# Patient Record
Sex: FEMALE | Race: WHITE | NOT HISPANIC OR LATINO | URBAN - METROPOLITAN AREA
[De-identification: names, ages, dates, MRNs, and addresses within clinical notes are randomized per-mention and may not be internally consistent; named-entity substitution may affect disease eponyms.]

---

## 2020-08-25 ENCOUNTER — OFFICE VISIT (OUTPATIENT)
Dept: URBAN - METROPOLITAN AREA TELEHEALTH 2 | Facility: TELEHEALTH | Age: 60
End: 2020-08-25

## 2020-08-25 ENCOUNTER — OFFICE VISIT (OUTPATIENT)
Dept: URBAN - METROPOLITAN AREA CLINIC 63 | Facility: CLINIC | Age: 60
End: 2020-08-25

## 2020-10-05 ENCOUNTER — OFFICE VISIT (OUTPATIENT)
Dept: URBAN - METROPOLITAN AREA CLINIC 121 | Facility: CLINIC | Age: 60
End: 2020-10-05

## 2021-06-02 ENCOUNTER — OFFICE VISIT (OUTPATIENT)
Dept: URBAN - METROPOLITAN AREA CLINIC 60 | Facility: CLINIC | Age: 61
End: 2021-06-02

## 2021-07-02 LAB
% SATURATION: (no result)
FERRITIN: (no result)
FOLATE, SERUM: (no result)
IRON BINDING CAPACITY: (no result)
IRON, TOTAL: (no result)
MITOGEN-NIL: (no result)
NIL: (no result)
QUANTIFERON(R)-TB GOLD PLUS, 1 TUBE: NEGATIVE
TB1-NIL: (no result)
TB2-NIL: (no result)
VITAMIN B12: (no result)

## 2021-10-11 ENCOUNTER — OFFICE VISIT (OUTPATIENT)
Dept: URBAN - METROPOLITAN AREA CLINIC 121 | Facility: CLINIC | Age: 61
End: 2021-10-11

## 2022-02-23 ENCOUNTER — OFFICE VISIT (OUTPATIENT)
Dept: URBAN - METROPOLITAN AREA CLINIC 60 | Facility: CLINIC | Age: 62
End: 2022-02-23

## 2022-05-12 LAB
HEPATITIS B CORE ANTIBODY (IGM): (no result)
HEPATITIS B SURFACE ANTIGEN: (no result)

## 2022-06-10 ENCOUNTER — LAB OUTSIDE AN ENCOUNTER (OUTPATIENT)
Dept: URBAN - METROPOLITAN AREA CLINIC 121 | Facility: CLINIC | Age: 62
End: 2022-06-10

## 2022-06-16 LAB
RESULT:: (no result)
TEST NAME:: (no result)

## 2022-07-09 ENCOUNTER — TELEPHONE ENCOUNTER (OUTPATIENT)
Dept: URBAN - METROPOLITAN AREA CLINIC 121 | Facility: CLINIC | Age: 62
End: 2022-07-09

## 2022-07-09 RX ORDER — UMECLIDINIUM BROMIDE AND VILANTEROL TRIFENATATE 62.5; 25 UG/1; UG/1
POWDER RESPIRATORY (INHALATION) ONCE A DAY
Refills: 0 | OUTPATIENT
Start: 2018-08-10 | End: 2018-09-21

## 2022-07-09 RX ORDER — FERROUS SULFATE 325(65) MG
TABLET ORAL ONCE A DAY
Refills: 0 | OUTPATIENT
Start: 2018-08-10 | End: 2018-09-21

## 2022-07-09 RX ORDER — PREDNISONE 20 MG/1
2 ONCE A DAY TAKE 2 TABLETS ONCE A DAY IN THE MORNING TABLET ORAL
Refills: 0 | OUTPATIENT
Start: 2018-07-02 | End: 2018-09-21

## 2022-07-09 RX ORDER — GABAPENTIN 300 MG/1
CAPSULE ORAL TWICE A DAY
Refills: 0 | OUTPATIENT
Start: 2018-09-21 | End: 2018-11-30

## 2022-07-09 RX ORDER — ASPIRIN 81 MG/1
TABLET, DELAYED RELEASE ORAL ONCE A DAY
Refills: 0 | OUTPATIENT
Start: 2018-11-30 | End: 2021-06-02

## 2022-07-09 RX ORDER — ATENOLOL 100 MG/1
TABLET ORAL ONCE A DAY
Refills: 0 | OUTPATIENT
Start: 2018-09-21 | End: 2018-11-30

## 2022-07-09 RX ORDER — POTASSIUM CITRATE 10 MEQ/1
TABLET, EXTENDED RELEASE ORAL
Refills: 0 | OUTPATIENT
Start: 2018-08-10 | End: 2018-09-21

## 2022-07-09 RX ORDER — MELOXICAM 15 MG/1
TABLET ORAL ONCE A DAY
Refills: 0 | OUTPATIENT
Start: 2018-05-15 | End: 2018-06-19

## 2022-07-09 RX ORDER — MELOXICAM 15 MG/1
TABLET ORAL ONCE A DAY
Refills: 0 | OUTPATIENT
Start: 2021-06-02 | End: 2022-02-23

## 2022-07-09 RX ORDER — OMEPRAZOLE 40 MG/1
ONCE A DAY CAPSULE, DELAYED RELEASE ORAL ONCE A DAY
Refills: 3 | OUTPATIENT
Start: 2021-06-02 | End: 2022-06-20

## 2022-07-09 RX ORDER — MELOXICAM 15 MG/1
TABLET ORAL ONCE A DAY
Refills: 0 | OUTPATIENT
Start: 2018-06-19 | End: 2018-08-10

## 2022-07-09 RX ORDER — FLUTICASONE PROPIONATE 50 UG/1
SPRAY, METERED NASAL TWICE A DAY
Refills: 0 | OUTPATIENT
Start: 2018-11-30 | End: 2021-06-02

## 2022-07-09 RX ORDER — AZATHIOPRINE 50 MG/1
ONCE A DAY TABLET ORAL ONCE A DAY
Refills: 0 | OUTPATIENT
Start: 2018-08-10 | End: 2018-09-21

## 2022-07-09 RX ORDER — ALLOPURINOL 100 MG/1
ONCE A DAY TABLET ORAL ONCE A DAY
Refills: 1 | OUTPATIENT
Start: 2018-09-27 | End: 2019-04-19

## 2022-07-09 RX ORDER — HYDROCODONE BITARTRATE AND ACETAMINOPHEN 7.5; 325 MG/1; MG/1
TABLET ORAL
Refills: 0 | OUTPATIENT
Start: 2018-06-19 | End: 2018-08-10

## 2022-07-09 RX ORDER — SIMVASTATIN 40 MG/1
TABLET, FILM COATED ORAL ONCE A DAY
Refills: 0 | OUTPATIENT
Start: 2018-11-30 | End: 2021-06-02

## 2022-07-09 RX ORDER — GABAPENTIN 300 MG/1
CAPSULE ORAL TWICE A DAY
Refills: 0 | OUTPATIENT
Start: 2018-08-10 | End: 2018-09-21

## 2022-07-09 RX ORDER — ALBUTEROL SULFATE 90 UG/1
AEROSOL, METERED RESPIRATORY (INHALATION) TWICE A DAY
Refills: 0 | OUTPATIENT
Start: 2018-11-30 | End: 2021-06-02

## 2022-07-09 RX ORDER — ASPIRIN 81 MG/1
TABLET, DELAYED RELEASE ORAL ONCE A DAY
Refills: 0 | OUTPATIENT
Start: 2018-08-10 | End: 2018-09-21

## 2022-07-09 RX ORDER — FLUTICASONE PROPIONATE 50 UG/1
SPRAY, METERED NASAL TWICE A DAY
Refills: 0 | OUTPATIENT
Start: 2018-09-21 | End: 2018-11-30

## 2022-07-09 RX ORDER — ALBUTEROL SULFATE 90 UG/1
AEROSOL, METERED RESPIRATORY (INHALATION) TWICE A DAY
Refills: 0 | OUTPATIENT
Start: 2018-05-15 | End: 2018-06-19

## 2022-07-09 RX ORDER — ASPIRIN 81 MG/1
TABLET, DELAYED RELEASE ORAL ONCE A DAY
Refills: 0 | OUTPATIENT
Start: 2018-09-21 | End: 2018-11-30

## 2022-07-09 RX ORDER — SIMVASTATIN 40 MG/1
TABLET, FILM COATED ORAL ONCE A DAY
Refills: 0 | OUTPATIENT
Start: 2018-06-19 | End: 2018-08-10

## 2022-07-09 RX ORDER — ALLOPURINOL 100 MG/1
ONCE A DAY TABLET ORAL ONCE A DAY
Refills: 3 | OUTPATIENT
Start: 2019-08-14 | End: 2020-02-26

## 2022-07-09 RX ORDER — POTASSIUM CITRATE 10 MEQ/1
TABLET, EXTENDED RELEASE ORAL
Refills: 0 | OUTPATIENT
Start: 2018-11-30 | End: 2021-06-02

## 2022-07-09 RX ORDER — GABAPENTIN 300 MG/1
CAPSULE ORAL TWICE A DAY
Refills: 0 | OUTPATIENT
Start: 2018-06-19 | End: 2018-08-10

## 2022-07-09 RX ORDER — POTASSIUM CITRATE 10 MEQ/1
TABLET, EXTENDED RELEASE ORAL
Refills: 0 | OUTPATIENT
Start: 2018-05-15 | End: 2018-06-19

## 2022-07-09 RX ORDER — SIMVASTATIN 40 MG/1
TABLET, FILM COATED ORAL ONCE A DAY
Refills: 0 | OUTPATIENT
Start: 2018-09-21 | End: 2018-11-30

## 2022-07-09 RX ORDER — OMEPRAZOLE 40 MG/1
ONCE A DAY CAPSULE, DELAYED RELEASE ORAL ONCE A DAY
Refills: 3 | OUTPATIENT
Start: 2020-02-26 | End: 2020-08-25

## 2022-07-09 RX ORDER — OMEPRAZOLE 40 MG/1
ONCE A DAY CAPSULE, DELAYED RELEASE ORAL ONCE A DAY
Refills: 3 | OUTPATIENT
Start: 2020-08-25 | End: 2021-06-02

## 2022-07-09 RX ORDER — ALBUTEROL SULFATE 90 UG/1
AEROSOL, METERED RESPIRATORY (INHALATION) TWICE A DAY
Refills: 0 | OUTPATIENT
Start: 2018-06-19 | End: 2018-08-10

## 2022-07-09 RX ORDER — ASPIRIN 81 MG/1
TABLET, DELAYED RELEASE ORAL ONCE A DAY
Refills: 0 | OUTPATIENT
Start: 2018-06-19 | End: 2018-08-10

## 2022-07-09 RX ORDER — OMEPRAZOLE 40 MG/1
ONCE A DAY CAPSULE, DELAYED RELEASE ORAL ONCE A DAY
Refills: 3 | OUTPATIENT
Start: 2019-08-14 | End: 2020-02-26

## 2022-07-09 RX ORDER — ALBUTEROL SULFATE 0.63 MG/3ML
SOLUTION RESPIRATORY (INHALATION) AS NEEDED
Refills: 0 | OUTPATIENT
Start: 2018-06-19 | End: 2018-08-10

## 2022-07-09 RX ORDER — ATENOLOL 100 MG/1
TABLET ORAL ONCE A DAY
Refills: 0 | OUTPATIENT
Start: 2018-05-15 | End: 2018-06-19

## 2022-07-09 RX ORDER — FLUTICASONE PROPIONATE 50 UG/1
SPRAY, METERED NASAL TWICE A DAY
Refills: 0 | OUTPATIENT
Start: 2018-05-15 | End: 2018-06-19

## 2022-07-09 RX ORDER — ALBUTEROL SULFATE 0.63 MG/3ML
SOLUTION RESPIRATORY (INHALATION) AS NEEDED
Refills: 0 | OUTPATIENT
Start: 2018-05-15 | End: 2018-06-19

## 2022-07-09 RX ORDER — SIMVASTATIN 40 MG/1
TABLET, FILM COATED ORAL ONCE A DAY
Refills: 0 | OUTPATIENT
Start: 2018-05-15 | End: 2018-06-19

## 2022-07-09 RX ORDER — FERROUS SULFATE 325(65) MG
TABLET ORAL ONCE A DAY
Refills: 0 | OUTPATIENT
Start: 2018-09-21 | End: 2018-11-30

## 2022-07-09 RX ORDER — SIMVASTATIN 40 MG/1
TABLET, FILM COATED ORAL ONCE A DAY
Refills: 0 | OUTPATIENT
Start: 2018-08-10 | End: 2018-09-21

## 2022-07-09 RX ORDER — POTASSIUM CITRATE 10 MEQ/1
TABLET, EXTENDED RELEASE ORAL
Refills: 0 | OUTPATIENT
Start: 2018-06-19 | End: 2018-08-10

## 2022-07-09 RX ORDER — ALBUTEROL SULFATE 90 UG/1
AEROSOL, METERED RESPIRATORY (INHALATION) TWICE A DAY
Refills: 0 | OUTPATIENT
Start: 2018-09-21 | End: 2018-11-30

## 2022-07-09 RX ORDER — AZATHIOPRINE 50 MG/1
ONCE A DAY TABLET ORAL ONCE A DAY
Refills: 1 | OUTPATIENT
Start: 2019-04-19 | End: 2019-08-14

## 2022-07-09 RX ORDER — ALLOPURINOL 100 MG/1
ONCE A DAY TABLET ORAL ONCE A DAY
Refills: 2 | OUTPATIENT
Start: 2018-07-13 | End: 2018-09-21

## 2022-07-09 RX ORDER — ATENOLOL 100 MG/1
TABLET ORAL ONCE A DAY
Refills: 0 | OUTPATIENT
Start: 2018-06-19 | End: 2018-08-10

## 2022-07-09 RX ORDER — FERROUS SULFATE 325(65) MG
TABLET ORAL ONCE A DAY
Refills: 0 | OUTPATIENT
Start: 2018-06-19 | End: 2018-08-10

## 2022-07-09 RX ORDER — ATENOLOL 100 MG/1
TABLET ORAL ONCE A DAY
Refills: 0 | OUTPATIENT
Start: 2018-11-30 | End: 2021-06-02

## 2022-07-09 RX ORDER — ALBUTEROL SULFATE 0.63 MG/3ML
SOLUTION RESPIRATORY (INHALATION) AS NEEDED
Refills: 0 | OUTPATIENT
Start: 2018-08-10 | End: 2018-09-21

## 2022-07-09 RX ORDER — HYDROCODONE BITARTRATE AND ACETAMINOPHEN 7.5; 325 MG/1; MG/1
TABLET ORAL
Refills: 0 | OUTPATIENT
Start: 2018-05-15 | End: 2018-06-19

## 2022-07-09 RX ORDER — AZATHIOPRINE 50 MG/1
ONCE A DAY TABLET ORAL ONCE A DAY
Refills: 3 | OUTPATIENT
Start: 2020-02-26 | End: 2020-08-25

## 2022-07-09 RX ORDER — AZATHIOPRINE 50 MG/1
ONCE A DAY TABLET ORAL ONCE A DAY
Refills: 1 | OUTPATIENT
Start: 2018-09-27 | End: 2019-04-19

## 2022-07-09 RX ORDER — ALBUTEROL SULFATE 0.63 MG/3ML
SOLUTION RESPIRATORY (INHALATION) AS NEEDED
Refills: 0 | OUTPATIENT
Start: 2018-11-30 | End: 2021-06-02

## 2022-07-09 RX ORDER — FERROUS SULFATE 325(65) MG
TABLET ORAL ONCE A DAY
Refills: 0 | OUTPATIENT
Start: 2018-11-30 | End: 2021-06-02

## 2022-07-09 RX ORDER — GABAPENTIN 300 MG/1
CAPSULE ORAL TWICE A DAY
Refills: 0 | OUTPATIENT
Start: 2018-05-15 | End: 2018-06-19

## 2022-07-09 RX ORDER — FERROUS SULFATE 325(65) MG
TABLET ORAL ONCE A DAY
Refills: 0 | OUTPATIENT
Start: 2018-05-15 | End: 2018-06-19

## 2022-07-09 RX ORDER — HYDROCODONE BITARTRATE AND ACETAMINOPHEN 7.5; 325 MG/1; MG/1
TABLET ORAL
Refills: 0 | OUTPATIENT
Start: 2018-08-10 | End: 2018-09-21

## 2022-07-09 RX ORDER — HYDROCODONE BITARTRATE AND ACETAMINOPHEN 7.5; 325 MG/1; MG/1
TABLET ORAL
Refills: 0 | OUTPATIENT
Start: 2018-09-21 | End: 2018-11-30

## 2022-07-09 RX ORDER — ASPIRIN 81 MG/1
TABLET, DELAYED RELEASE ORAL ONCE A DAY
Refills: 0 | OUTPATIENT
Start: 2018-05-15 | End: 2018-06-19

## 2022-07-09 RX ORDER — PREDNISONE 20 MG/1
TAKE 2 TABLETS ONCE A DAY IN THE MORNING TABLET ORAL
Refills: 0 | OUTPATIENT
Start: 2018-06-08 | End: 2018-06-19

## 2022-07-09 RX ORDER — AZATHIOPRINE 50 MG/1
ONCE A DAY TABLET ORAL ONCE A DAY
Refills: 3 | OUTPATIENT
Start: 2020-08-25 | End: 2021-06-02

## 2022-07-09 RX ORDER — POTASSIUM CITRATE 10 MEQ/1
TABLET, EXTENDED RELEASE ORAL
Refills: 0 | OUTPATIENT
Start: 2018-09-21 | End: 2018-11-30

## 2022-07-09 RX ORDER — ONDANSETRON HYDROCHLORIDE 4 MG/1
USE AS DIRECTED TAKE 1 TABLET 30 MINUTES PRIOR TO EACH HALF OF COLONOSCOPY PREP TO PREVENT NAUSEA TABLET, FILM COATED ORAL
Refills: 0 | OUTPATIENT
Start: 2019-04-19 | End: 2019-08-14

## 2022-07-09 RX ORDER — FLUTICASONE PROPIONATE 50 UG/1
SPRAY, METERED NASAL TWICE A DAY
Refills: 0 | OUTPATIENT
Start: 2018-06-19 | End: 2018-08-10

## 2022-07-09 RX ORDER — HYDROCODONE BITARTRATE AND ACETAMINOPHEN 7.5; 325 MG/1; MG/1
TABLET ORAL
Refills: 0 | OUTPATIENT
Start: 2018-11-30 | End: 2021-06-02

## 2022-07-09 RX ORDER — ONDANSETRON 4 MG/1
THREE TIMES A DAY TABLET, ORALLY DISINTEGRATING ORAL THREE TIMES A DAY
Refills: 8 | OUTPATIENT
Start: 2018-08-10 | End: 2019-08-14

## 2022-07-09 RX ORDER — MELOXICAM 15 MG/1
TABLET ORAL ONCE A DAY
Refills: 0 | OUTPATIENT
Start: 2018-11-30 | End: 2021-06-02

## 2022-07-09 RX ORDER — MELOXICAM 15 MG/1
TABLET ORAL ONCE A DAY
Refills: 0 | OUTPATIENT
Start: 2018-09-21 | End: 2018-11-30

## 2022-07-09 RX ORDER — ALLOPURINOL 100 MG/1
ONCE A DAY TABLET ORAL ONCE A DAY
Refills: 3 | OUTPATIENT
Start: 2020-02-26 | End: 2020-08-25

## 2022-07-09 RX ORDER — ALLOPURINOL 100 MG/1
ONCE A DAY TABLET ORAL ONCE A DAY
Refills: 3 | OUTPATIENT
Start: 2021-06-02 | End: 2022-02-23

## 2022-07-09 RX ORDER — UMECLIDINIUM BROMIDE AND VILANTEROL TRIFENATATE 62.5; 25 UG/1; UG/1
POWDER RESPIRATORY (INHALATION) ONCE A DAY
Refills: 0 | OUTPATIENT
Start: 2018-09-21 | End: 2018-11-30

## 2022-07-09 RX ORDER — UMECLIDINIUM BROMIDE AND VILANTEROL TRIFENATATE 62.5; 25 UG/1; UG/1
POWDER RESPIRATORY (INHALATION) ONCE A DAY
Refills: 0 | OUTPATIENT
Start: 2018-05-15 | End: 2018-06-19

## 2022-07-09 RX ORDER — ATENOLOL 100 MG/1
TABLET ORAL ONCE A DAY
Refills: 0 | OUTPATIENT
Start: 2018-08-10 | End: 2018-09-21

## 2022-07-09 RX ORDER — AZATHIOPRINE 50 MG/1
ONCE A DAY TABLET ORAL ONCE A DAY
Refills: 3 | OUTPATIENT
Start: 2021-06-02 | End: 2022-02-23

## 2022-07-09 RX ORDER — FLUTICASONE PROPIONATE 50 UG/1
SPRAY, METERED NASAL TWICE A DAY
Refills: 0 | OUTPATIENT
Start: 2018-08-10 | End: 2018-09-21

## 2022-07-09 RX ORDER — MELOXICAM 15 MG/1
TABLET ORAL ONCE A DAY
Refills: 0 | OUTPATIENT
Start: 2018-08-10 | End: 2018-09-21

## 2022-07-09 RX ORDER — ALLOPURINOL 100 MG/1
ONCE A DAY TABLET ORAL ONCE A DAY
Refills: 3 | OUTPATIENT
Start: 2019-04-19 | End: 2019-08-14

## 2022-07-09 RX ORDER — ALBUTEROL SULFATE 0.63 MG/3ML
SOLUTION RESPIRATORY (INHALATION) AS NEEDED
Refills: 0 | OUTPATIENT
Start: 2018-09-21 | End: 2018-11-30

## 2022-07-09 RX ORDER — ALBUTEROL SULFATE 90 UG/1
AEROSOL, METERED RESPIRATORY (INHALATION) TWICE A DAY
Refills: 0 | OUTPATIENT
Start: 2018-08-10 | End: 2018-09-21

## 2022-07-09 RX ORDER — AZATHIOPRINE 50 MG/1
ONCE A DAY TABLET ORAL ONCE A DAY
Refills: 3 | OUTPATIENT
Start: 2019-08-14 | End: 2020-02-26

## 2022-07-09 RX ORDER — UMECLIDINIUM BROMIDE AND VILANTEROL TRIFENATATE 62.5; 25 UG/1; UG/1
POWDER RESPIRATORY (INHALATION) ONCE A DAY
Refills: 0 | OUTPATIENT
Start: 2018-11-30 | End: 2021-06-02

## 2022-07-09 RX ORDER — UMECLIDINIUM BROMIDE AND VILANTEROL TRIFENATATE 62.5; 25 UG/1; UG/1
POWDER RESPIRATORY (INHALATION) ONCE A DAY
Refills: 0 | OUTPATIENT
Start: 2018-06-19 | End: 2018-08-10

## 2022-07-09 RX ORDER — GABAPENTIN 300 MG/1
CAPSULE ORAL TWICE A DAY
Refills: 0 | OUTPATIENT
Start: 2018-11-30 | End: 2021-06-02

## 2022-07-09 RX ORDER — ALLOPURINOL 100 MG/1
ONCE A DAY TABLET ORAL ONCE A DAY
Refills: 3 | OUTPATIENT
Start: 2020-08-25 | End: 2021-06-02

## 2022-07-09 RX ORDER — OMEPRAZOLE 40 MG/1
ONCE A DAY CAPSULE, DELAYED RELEASE ORAL ONCE A DAY
Refills: 3 | OUTPATIENT
Start: 2019-01-29 | End: 2019-08-14

## 2022-07-09 RX ORDER — AZATHIOPRINE 50 MG/1
ONCE A DAY TABLET ORAL ONCE A DAY
Refills: 0 | OUTPATIENT
Start: 2018-07-13 | End: 2018-09-21

## 2022-07-10 ENCOUNTER — TELEPHONE ENCOUNTER (OUTPATIENT)
Dept: URBAN - METROPOLITAN AREA CLINIC 121 | Facility: CLINIC | Age: 62
End: 2022-07-10

## 2022-07-10 RX ORDER — FERROUS SULFATE 325(65) MG
TABLET ORAL ONCE A DAY
Refills: 0 | Status: ACTIVE | COMMUNITY
Start: 2021-06-02

## 2022-07-10 RX ORDER — OMEPRAZOLE 40 MG/1
ONCE A DAY CAPSULE, DELAYED RELEASE ORAL ONCE A DAY
Refills: 1 | Status: ACTIVE | COMMUNITY
Start: 2022-06-20

## 2022-07-10 RX ORDER — POTASSIUM CITRATE 10 MEQ/1
TABLET, EXTENDED RELEASE ORAL
Refills: 0 | Status: ACTIVE | COMMUNITY
Start: 2021-06-02

## 2022-07-10 RX ORDER — CELECOXIB 200 MG
CAPSULE ORAL
Refills: 0 | Status: ACTIVE | COMMUNITY
Start: 2022-02-23

## 2022-07-10 RX ORDER — ONDANSETRON 4 MG/1
THREE TIMES A DAY AS NEEDED FOR NAUSEA TABLET, ORALLY DISINTEGRATING ORAL THREE TIMES A DAY
Refills: 3 | Status: ACTIVE | COMMUNITY
Start: 2020-08-25

## 2022-07-10 RX ORDER — SIMVASTATIN 40 MG/1
TABLET, FILM COATED ORAL ONCE A DAY
Refills: 0 | Status: ACTIVE | COMMUNITY
Start: 2021-06-02

## 2022-07-10 RX ORDER — FLUTICASONE PROPIONATE 50 UG/1
SPRAY, METERED NASAL TWICE A DAY
Refills: 0 | Status: ACTIVE | COMMUNITY
Start: 2021-06-02

## 2022-07-10 RX ORDER — ALLOPURINOL 100 MG/1
ONCE A DAY TABLET ORAL ONCE A DAY
Refills: 3 | Status: ACTIVE | COMMUNITY
Start: 2022-02-23

## 2022-07-10 RX ORDER — ALBUTEROL SULFATE 90 UG/1
AEROSOL, METERED RESPIRATORY (INHALATION) TWICE A DAY
Refills: 0 | Status: ACTIVE | COMMUNITY
Start: 2021-06-02

## 2022-07-10 RX ORDER — AZATHIOPRINE 50 MG/1
ONCE A DAY TABLET ORAL ONCE A DAY
Refills: 3 | Status: ACTIVE | COMMUNITY
Start: 2022-02-23

## 2022-07-10 RX ORDER — DICYCLOMINE HYDROCHLORIDE 10 MG/1
1 CAPSULE UP TO THREE TIMES A DAY AS NEEDED FOR ABDOMINAL PAIN CAPSULE ORAL THREE TIMES A DAY
Refills: 6 | Status: ACTIVE | COMMUNITY
Start: 2020-08-25

## 2022-07-10 RX ORDER — HYDROCODONE BITARTRATE AND ACETAMINOPHEN 7.5; 325 MG/1; MG/1
TABLET ORAL
Refills: 0 | Status: ACTIVE | COMMUNITY
Start: 2021-06-02

## 2022-07-10 RX ORDER — GABAPENTIN 300 MG/1
CAPSULE ORAL TWICE A DAY
Refills: 0 | Status: ACTIVE | COMMUNITY
Start: 2021-06-02

## 2022-07-10 RX ORDER — ASPIRIN 81 MG/1
TABLET, DELAYED RELEASE ORAL ONCE A DAY
Refills: 0 | Status: ACTIVE | COMMUNITY
Start: 2021-06-02

## 2022-07-10 RX ORDER — ATENOLOL 100 MG/1
TABLET ORAL ONCE A DAY
Refills: 0 | Status: ACTIVE | COMMUNITY
Start: 2021-06-02

## 2022-07-10 RX ORDER — ALBUTEROL SULFATE 0.63 MG/3ML
SOLUTION RESPIRATORY (INHALATION) AS NEEDED
Refills: 0 | Status: ACTIVE | COMMUNITY
Start: 2021-06-02

## 2022-07-10 RX ORDER — OLMESARTAN MEDOXOMIL 20 MG/1
TABLET, FILM COATED ORAL
Refills: 0 | Status: ACTIVE | COMMUNITY
Start: 2020-09-21

## 2022-07-10 RX ORDER — UMECLIDINIUM BROMIDE AND VILANTEROL TRIFENATATE 62.5; 25 UG/1; UG/1
POWDER RESPIRATORY (INHALATION) ONCE A DAY
Refills: 0 | Status: ACTIVE | COMMUNITY
Start: 2021-06-02

## 2022-09-13 ENCOUNTER — TELEPHONE ENCOUNTER (OUTPATIENT)
Dept: URBAN - METROPOLITAN AREA CLINIC 63 | Facility: CLINIC | Age: 62
End: 2022-09-13

## 2022-12-14 ENCOUNTER — OFFICE VISIT (OUTPATIENT)
Dept: URBAN - METROPOLITAN AREA CLINIC 60 | Facility: CLINIC | Age: 62
End: 2022-12-14
Payer: MEDICARE

## 2022-12-14 ENCOUNTER — WEB ENCOUNTER (OUTPATIENT)
Dept: URBAN - METROPOLITAN AREA CLINIC 60 | Facility: CLINIC | Age: 62
End: 2022-12-14

## 2022-12-14 VITALS
BODY MASS INDEX: 20.6 KG/M2 | TEMPERATURE: 97.8 F | HEIGHT: 66 IN | OXYGEN SATURATION: 95 % | HEART RATE: 62 BPM | DIASTOLIC BLOOD PRESSURE: 70 MMHG | WEIGHT: 128.2 LBS | SYSTOLIC BLOOD PRESSURE: 128 MMHG

## 2022-12-14 DIAGNOSIS — R11.0 NAUSEA: ICD-10-CM

## 2022-12-14 DIAGNOSIS — K50.80 CROHN'S DISEASE OF BOTH SMALL AND LARGE INTESTINE WITHOUT COMPLICATION: ICD-10-CM

## 2022-12-14 DIAGNOSIS — J44.9 CHRONIC OBSTRUCTIVE PULMONARY DISEASE, UNSPECIFIED COPD TYPE: ICD-10-CM

## 2022-12-14 DIAGNOSIS — R19.7 DIARRHEA, UNSPECIFIED TYPE: ICD-10-CM

## 2022-12-14 PROCEDURE — 99214 OFFICE O/P EST MOD 30 MIN: CPT | Performed by: NURSE PRACTITIONER

## 2022-12-14 RX ORDER — OLMESARTAN MEDOXOMIL 20 MG/1
TABLET, FILM COATED ORAL
Refills: 0 | Status: ACTIVE | COMMUNITY
Start: 2020-09-21

## 2022-12-14 RX ORDER — ALBUTEROL SULFATE 90 UG/1
AEROSOL, METERED RESPIRATORY (INHALATION) TWICE A DAY
Refills: 0 | Status: ACTIVE | COMMUNITY
Start: 2021-06-02

## 2022-12-14 RX ORDER — ALBUTEROL SULFATE 0.63 MG/3ML
SOLUTION RESPIRATORY (INHALATION) AS NEEDED
Refills: 0 | Status: ACTIVE | COMMUNITY
Start: 2021-06-02

## 2022-12-14 RX ORDER — OMEPRAZOLE 40 MG/1
ONCE A DAY CAPSULE, DELAYED RELEASE ORAL ONCE A DAY
Qty: 90 | Refills: 3

## 2022-12-14 RX ORDER — ASPIRIN 81 MG/1
TABLET, DELAYED RELEASE ORAL ONCE A DAY
Refills: 0 | Status: ACTIVE | COMMUNITY
Start: 2021-06-02

## 2022-12-14 RX ORDER — FLUTICASONE PROPIONATE 50 UG/1
SPRAY, METERED NASAL TWICE A DAY
Refills: 0 | Status: ACTIVE | COMMUNITY
Start: 2021-06-02

## 2022-12-14 RX ORDER — CELECOXIB 200 MG
CAPSULE ORAL
Refills: 0 | Status: ACTIVE | COMMUNITY
Start: 2022-02-23

## 2022-12-14 RX ORDER — ONDANSETRON HYDROCHLORIDE 4 MG/1
1 TABLET TABLET, FILM COATED ORAL
Qty: 90 | Refills: 1 | OUTPATIENT
Start: 2022-12-14

## 2022-12-14 RX ORDER — UMECLIDINIUM BROMIDE AND VILANTEROL TRIFENATATE 62.5; 25 UG/1; UG/1
POWDER RESPIRATORY (INHALATION) ONCE A DAY
Refills: 0 | Status: ACTIVE | COMMUNITY
Start: 2021-06-02

## 2022-12-14 RX ORDER — SIMVASTATIN 40 MG/1
TABLET, FILM COATED ORAL ONCE A DAY
Refills: 0 | Status: ACTIVE | COMMUNITY
Start: 2021-06-02

## 2022-12-14 RX ORDER — OMEPRAZOLE 40 MG/1
ONCE A DAY CAPSULE, DELAYED RELEASE ORAL ONCE A DAY
Refills: 1 | Status: ACTIVE | COMMUNITY
Start: 2022-06-20

## 2022-12-14 RX ORDER — POTASSIUM CITRATE 10 MEQ/1
TABLET, EXTENDED RELEASE ORAL
Refills: 0 | Status: ACTIVE | COMMUNITY
Start: 2021-06-02

## 2022-12-14 RX ORDER — AZATHIOPRINE 50 MG/1
ONCE A DAY TABLET ORAL ONCE A DAY
Refills: 3 | Status: ACTIVE | COMMUNITY
Start: 2022-02-23

## 2022-12-14 RX ORDER — ATENOLOL 100 MG/1
TABLET ORAL ONCE A DAY
Refills: 0 | Status: ACTIVE | COMMUNITY
Start: 2021-06-02

## 2022-12-14 RX ORDER — ONDANSETRON 4 MG/1
THREE TIMES A DAY AS NEEDED FOR NAUSEA TABLET, ORALLY DISINTEGRATING ORAL THREE TIMES A DAY
Refills: 3 | Status: ACTIVE | COMMUNITY
Start: 2020-08-25

## 2022-12-14 RX ORDER — HYDROCODONE BITARTRATE AND ACETAMINOPHEN 7.5; 325 MG/1; MG/1
TABLET ORAL
Refills: 0 | Status: ACTIVE | COMMUNITY
Start: 2021-06-02

## 2022-12-14 RX ORDER — ALLOPURINOL 100 MG/1
ONCE A DAY TABLET ORAL ONCE A DAY
Refills: 3 | Status: ACTIVE | COMMUNITY
Start: 2022-02-23

## 2022-12-14 RX ORDER — DICYCLOMINE HYDROCHLORIDE 10 MG/1
1 CAPSULE UP TO THREE TIMES A DAY AS NEEDED FOR ABDOMINAL PAIN CAPSULE ORAL THREE TIMES A DAY
Refills: 6 | Status: ACTIVE | COMMUNITY
Start: 2020-08-25

## 2022-12-14 RX ORDER — FERROUS SULFATE 325(65) MG
TABLET ORAL ONCE A DAY
Refills: 0 | Status: ACTIVE | COMMUNITY
Start: 2021-06-02

## 2022-12-14 RX ORDER — GABAPENTIN 300 MG/1
CAPSULE ORAL TWICE A DAY
Refills: 0 | Status: ACTIVE | COMMUNITY
Start: 2021-06-02

## 2022-12-14 NOTE — HPI-TODAY'S VISIT:
Generally having 2-3 bm per day but only in the early morning hours, wakes her up at night around 3 am. Takes imodium at bedtime. Occasional nausea, once a week, resolves with zofran. No abdominal pain, stools loose to mushy. Active disease on last colonoscopy but most recent CTE unremarkable though this is much less sensitive.

## 2022-12-14 NOTE — HPI-CROHN'S DISEASE
Last Colonoscopy:  Patchy mild inflammation found in the entire examined colon, pathology demonstrates active chronic colitis.  Mucosa of the terminal ileum was congested and ulcerated, c/w crohn's ileitis on pathology.  hyperplastic inflammatory polyps in splenic flexure and rectum  Current IBD Meds:  Imuran 50mg plus allopurinol 100mg/day  Prior IBD Meds:  Imuran 50mg plus allopurinol 100mg/day  Steroid use/response: Prednisone 40mg/day frequently for lung issues but not chronic  Most recent imagin2019 ugi showed thickening of gastric folds, unable to evaluate due to lung disease 2020  CTE shows no active disease but demonstrates gastric folding thickening  Extraintestinal manifestations:  none  Inflammatory Markers: 2019 CRP wnl  19-On 40mg/day of prednisone: Calprotectin 267.7,  CRP 0.6,  Lactoferrin <30  Required  biologic testin2022 hep b negative, quant gold not run. Last negative quant gold 2021  Risk factors for severe disease: She is current smoker(e-cig), she is trying to quit. She is aware smoking worsens crohn's disease and that I recommend she stop smoking.  IBD surgical history: none  Personal history of cancer:  none  Cardiac history: none

## 2023-01-12 LAB
A/G RATIO: 1.8
ALBUMIN: 4.2
ALKALINE PHOSPHATASE: 56
ALT (SGPT): 11
AST (SGOT): 14
BILIRUBIN, TOTAL: 0.7
BUN/CREATININE RATIO: 39
BUN: 30
C-REACTIVE PROTEIN, QUANT: 0.9
CALCIUM: 9.7
CARBON DIOXIDE, TOTAL: 26
CHLORIDE: 108
CREATININE: 0.77
EGFR: 87
FERRITIN, SERUM: 530
FOLATE (FOLIC ACID), SERUM: 14.2
GLOBULIN, TOTAL: 2.4
GLUCOSE: 92
HEMATOCRIT: 38.5
HEMOGLOBIN: 13.5
HEP B CORE AB, IGM: (no result)
HEPATITIS B SURFACE ANTIGEN: (no result)
IRON BIND.CAP.(TIBC): 296
IRON SATURATION: 32
IRON: 95
MCH: 35.4
MCHC: 35.1
MCV: 101
MITOGEN-NIL: >10
MPV: 9.5
PLATELET COUNT: 365
POTASSIUM: 4.6
PROTEIN, TOTAL: 6.6
QUANTIFERON NIL VALUE: 0.02
QUANTIFERON TB1 AG VALUE: 0.01
QUANTIFERON TB2 AG VALUE: 0.01
QUANTIFERON-TB GOLD PLUS: NEGATIVE
RDW: 11.7
RED BLOOD CELL COUNT: 3.81
SODIUM: 141
VITAMIN B12: 309
WHITE BLOOD CELL COUNT: 8.8

## 2023-01-31 ENCOUNTER — TELEPHONE ENCOUNTER (OUTPATIENT)
Dept: URBAN - METROPOLITAN AREA CLINIC 63 | Facility: CLINIC | Age: 63
End: 2023-01-31

## 2023-02-08 ENCOUNTER — OFFICE VISIT (OUTPATIENT)
Dept: URBAN - METROPOLITAN AREA CLINIC 60 | Facility: CLINIC | Age: 63
End: 2023-02-08

## 2023-02-22 ENCOUNTER — OFFICE VISIT (OUTPATIENT)
Dept: URBAN - METROPOLITAN AREA CLINIC 60 | Facility: CLINIC | Age: 63
End: 2023-02-22

## 2023-02-24 ENCOUNTER — TELEPHONE ENCOUNTER (OUTPATIENT)
Dept: URBAN - METROPOLITAN AREA CLINIC 63 | Facility: CLINIC | Age: 63
End: 2023-02-24

## 2023-03-09 ENCOUNTER — OFFICE VISIT (OUTPATIENT)
Dept: URBAN - METROPOLITAN AREA CLINIC 60 | Facility: CLINIC | Age: 63
End: 2023-03-09
Payer: MEDICARE

## 2023-03-09 ENCOUNTER — TELEPHONE ENCOUNTER (OUTPATIENT)
Dept: URBAN - METROPOLITAN AREA CLINIC 63 | Facility: CLINIC | Age: 63
End: 2023-03-09

## 2023-03-09 VITALS
OXYGEN SATURATION: 97 % | HEIGHT: 66 IN | DIASTOLIC BLOOD PRESSURE: 70 MMHG | HEART RATE: 78 BPM | TEMPERATURE: 98.2 F | BODY MASS INDEX: 19.93 KG/M2 | WEIGHT: 124 LBS | SYSTOLIC BLOOD PRESSURE: 124 MMHG

## 2023-03-09 DIAGNOSIS — K50.80 CROHN'S DISEASE OF BOTH SMALL AND LARGE INTESTINE WITHOUT COMPLICATION: ICD-10-CM

## 2023-03-09 DIAGNOSIS — C67.9 MALIGNANT NEOPLASM OF URINARY BLADDER, UNSPECIFIED SITE: ICD-10-CM

## 2023-03-09 DIAGNOSIS — J44.9 CHRONIC OBSTRUCTIVE PULMONARY DISEASE, UNSPECIFIED COPD TYPE: ICD-10-CM

## 2023-03-09 DIAGNOSIS — Z92.25 PERSONAL HISTORY OF IMMUNOSUPPRESSION THERAPY: ICD-10-CM

## 2023-03-09 PROBLEM — 399326009: Status: ACTIVE | Noted: 2023-03-09

## 2023-03-09 PROBLEM — 161651005: Status: ACTIVE | Noted: 2023-03-09

## 2023-03-09 PROBLEM — 71833008: Status: ACTIVE | Noted: 2022-12-14

## 2023-03-09 PROBLEM — 13645005: Status: ACTIVE | Noted: 2022-12-14

## 2023-03-09 PROCEDURE — 99214 OFFICE O/P EST MOD 30 MIN: CPT | Performed by: NURSE PRACTITIONER

## 2023-03-09 RX ORDER — FLUTICASONE PROPIONATE 50 UG/1
SPRAY, METERED NASAL TWICE A DAY
Refills: 0 | Status: ACTIVE | COMMUNITY
Start: 2021-06-02

## 2023-03-09 RX ORDER — ONDANSETRON 4 MG/1
THREE TIMES A DAY AS NEEDED FOR NAUSEA TABLET, ORALLY DISINTEGRATING ORAL THREE TIMES A DAY
Refills: 3 | Status: ACTIVE | COMMUNITY
Start: 2020-08-25

## 2023-03-09 RX ORDER — FERROUS SULFATE 325(65) MG
TABLET ORAL ONCE A DAY
Refills: 0 | Status: ACTIVE | COMMUNITY
Start: 2021-06-02

## 2023-03-09 RX ORDER — ALBUTEROL SULFATE 0.63 MG/3ML
SOLUTION RESPIRATORY (INHALATION) AS NEEDED
Refills: 0 | Status: ACTIVE | COMMUNITY
Start: 2021-06-02

## 2023-03-09 RX ORDER — UMECLIDINIUM BROMIDE AND VILANTEROL TRIFENATATE 62.5; 25 UG/1; UG/1
POWDER RESPIRATORY (INHALATION) ONCE A DAY
Refills: 0 | Status: ACTIVE | COMMUNITY
Start: 2021-06-02

## 2023-03-09 RX ORDER — POTASSIUM CITRATE 10 MEQ/1
TABLET, EXTENDED RELEASE ORAL
Refills: 0 | Status: ACTIVE | COMMUNITY
Start: 2021-06-02

## 2023-03-09 RX ORDER — ONDANSETRON HYDROCHLORIDE 4 MG/1
1 TABLET TABLET, FILM COATED ORAL
Qty: 90 | Refills: 1 | Status: ACTIVE | COMMUNITY
Start: 2022-12-14

## 2023-03-09 RX ORDER — CELECOXIB 200 MG
CAPSULE ORAL
Refills: 0 | Status: ACTIVE | COMMUNITY
Start: 2022-02-23

## 2023-03-09 RX ORDER — GABAPENTIN 300 MG/1
CAPSULE ORAL TWICE A DAY
Refills: 0 | Status: ACTIVE | COMMUNITY
Start: 2021-06-02

## 2023-03-09 RX ORDER — DICYCLOMINE HYDROCHLORIDE 10 MG/1
1 CAPSULE UP TO THREE TIMES A DAY AS NEEDED FOR ABDOMINAL PAIN CAPSULE ORAL THREE TIMES A DAY
Refills: 6 | Status: ACTIVE | COMMUNITY
Start: 2020-08-25

## 2023-03-09 RX ORDER — OLMESARTAN MEDOXOMIL 20 MG/1
TABLET, FILM COATED ORAL
Refills: 0 | Status: ACTIVE | COMMUNITY
Start: 2020-09-21

## 2023-03-09 RX ORDER — ATENOLOL 100 MG/1
TABLET ORAL ONCE A DAY
Refills: 0 | Status: ACTIVE | COMMUNITY
Start: 2021-06-02

## 2023-03-09 RX ORDER — AZATHIOPRINE 50 MG/1
ONCE A DAY TABLET ORAL ONCE A DAY
Qty: 90 | Refills: 0 | Status: ACTIVE | COMMUNITY

## 2023-03-09 RX ORDER — ALBUTEROL SULFATE 90 UG/1
AEROSOL, METERED RESPIRATORY (INHALATION) TWICE A DAY
Refills: 0 | Status: ACTIVE | COMMUNITY
Start: 2021-06-02

## 2023-03-09 RX ORDER — OMEPRAZOLE 40 MG/1
ONCE A DAY CAPSULE, DELAYED RELEASE ORAL ONCE A DAY
Qty: 90 | Refills: 3 | Status: ACTIVE | COMMUNITY

## 2023-03-09 RX ORDER — ALLOPURINOL 100 MG/1
ONCE A DAY TABLET ORAL ONCE A DAY
Refills: 3 | Status: ACTIVE | COMMUNITY
Start: 2022-02-23

## 2023-03-09 RX ORDER — AZATHIOPRINE 50 MG/1
ONCE A DAY TABLET ORAL ONCE A DAY
Qty: 90 | Refills: 0

## 2023-03-09 RX ORDER — HYDROCODONE BITARTRATE AND ACETAMINOPHEN 7.5; 325 MG/1; MG/1
TABLET ORAL
Refills: 0 | Status: ACTIVE | COMMUNITY
Start: 2021-06-02

## 2023-03-09 RX ORDER — ASPIRIN 81 MG/1
TABLET, DELAYED RELEASE ORAL ONCE A DAY
Refills: 0 | Status: ACTIVE | COMMUNITY
Start: 2021-06-02

## 2023-03-09 RX ORDER — SIMVASTATIN 40 MG/1
TABLET, FILM COATED ORAL ONCE A DAY
Refills: 0 | Status: ACTIVE | COMMUNITY
Start: 2021-06-02

## 2023-03-09 NOTE — HPI-TODAY'S VISIT:
Generally having 2-3 bm per day but only in the early morning hours with urgency, wakes her up at night around 3 am. Takes imodium at bedtime.  Occasional nausea, once a week, resolves with zofran. No abdominal pain, stools loose to mushy. Active disease on last colonoscopy but most recent CTE unremarkable though this is much less sensitive.  Diagnosed with bladder cancer recently and had excision. Starting instilled chemo in 2 weeks and urology feels she should hold off on starting skyrizi.

## 2023-03-09 NOTE — PHYSICAL EXAM CHEST:
no lesions,  no deformities,  no traumatic injuries,  no significant scars are present,  chest wall non-tender,  no masses present, breathing is unlabored without accessory muscle use, decreased breath sounds

## 2023-03-16 ENCOUNTER — TELEPHONE ENCOUNTER (OUTPATIENT)
Dept: URBAN - METROPOLITAN AREA CLINIC 63 | Facility: CLINIC | Age: 63
End: 2023-03-16

## 2023-03-16 RX ORDER — ALLOPURINOL 100 MG/1
ONCE A DAY TABLET ORAL ONCE A DAY
Refills: 3
Start: 2022-02-23

## 2023-04-03 ENCOUNTER — ERX REFILL RESPONSE (OUTPATIENT)
Dept: URBAN - METROPOLITAN AREA CLINIC 60 | Facility: CLINIC | Age: 63
End: 2023-04-03

## 2023-04-03 RX ORDER — ONDANSETRON HYDROCHLORIDE 4 MG/1
TAKE 1 TABLET BY MOUTH 3 TIMES DAILY TABLET, FILM COATED ORAL
Qty: 90 TABLET | Refills: 1 | OUTPATIENT

## 2023-04-03 RX ORDER — ONDANSETRON HYDROCHLORIDE 4 MG/1
1 TABLET TABLET, FILM COATED ORAL
Qty: 90 | Refills: 1 | OUTPATIENT

## 2023-05-11 ENCOUNTER — OFFICE VISIT (OUTPATIENT)
Dept: URBAN - METROPOLITAN AREA CLINIC 60 | Facility: CLINIC | Age: 63
End: 2023-05-11

## 2023-06-01 ENCOUNTER — OFFICE VISIT (OUTPATIENT)
Dept: URBAN - METROPOLITAN AREA CLINIC 60 | Facility: CLINIC | Age: 63
End: 2023-06-01

## 2023-06-08 ENCOUNTER — DASHBOARD ENCOUNTERS (OUTPATIENT)
Age: 63
End: 2023-06-08

## 2023-06-08 ENCOUNTER — OFFICE VISIT (OUTPATIENT)
Dept: URBAN - METROPOLITAN AREA CLINIC 60 | Facility: CLINIC | Age: 63
End: 2023-06-08
Payer: MEDICARE

## 2023-06-08 VITALS
HEIGHT: 66 IN | RESPIRATION RATE: 12 BRPM | BODY MASS INDEX: 19.3 KG/M2 | TEMPERATURE: 97.8 F | SYSTOLIC BLOOD PRESSURE: 122 MMHG | DIASTOLIC BLOOD PRESSURE: 68 MMHG | WEIGHT: 120.1 LBS | OXYGEN SATURATION: 91 % | HEART RATE: 63 BPM

## 2023-06-08 DIAGNOSIS — C67.9 MALIGNANT NEOPLASM OF URINARY BLADDER, UNSPECIFIED SITE: ICD-10-CM

## 2023-06-08 DIAGNOSIS — K50.80 CROHN'S DISEASE OF BOTH SMALL AND LARGE INTESTINE WITHOUT COMPLICATION: ICD-10-CM

## 2023-06-08 DIAGNOSIS — Z92.25 PERSONAL HISTORY OF IMMUNOSUPPRESSION THERAPY: ICD-10-CM

## 2023-06-08 PROCEDURE — 99214 OFFICE O/P EST MOD 30 MIN: CPT | Performed by: NURSE PRACTITIONER

## 2023-06-08 RX ORDER — GABAPENTIN 300 MG/1
CAPSULE ORAL TWICE A DAY
Refills: 0 | Status: ACTIVE | COMMUNITY
Start: 2021-06-02

## 2023-06-08 RX ORDER — UMECLIDINIUM BROMIDE AND VILANTEROL TRIFENATATE 62.5; 25 UG/1; UG/1
POWDER RESPIRATORY (INHALATION) ONCE A DAY
Refills: 0 | Status: ACTIVE | COMMUNITY
Start: 2021-06-02

## 2023-06-08 RX ORDER — ASPIRIN 81 MG/1
TABLET, DELAYED RELEASE ORAL ONCE A DAY
Refills: 0 | Status: ACTIVE | COMMUNITY
Start: 2021-06-02

## 2023-06-08 RX ORDER — FLUTICASONE PROPIONATE 50 UG/1
SPRAY, METERED NASAL TWICE A DAY
Refills: 0 | Status: ACTIVE | COMMUNITY
Start: 2021-06-02

## 2023-06-08 RX ORDER — ALBUTEROL SULFATE 0.63 MG/3ML
SOLUTION RESPIRATORY (INHALATION) AS NEEDED
Refills: 0 | Status: ACTIVE | COMMUNITY
Start: 2021-06-02

## 2023-06-08 RX ORDER — POTASSIUM CITRATE 10 MEQ/1
TABLET, EXTENDED RELEASE ORAL
Refills: 0 | Status: ACTIVE | COMMUNITY
Start: 2021-06-02

## 2023-06-08 RX ORDER — ONDANSETRON 4 MG/1
THREE TIMES A DAY AS NEEDED FOR NAUSEA TABLET, ORALLY DISINTEGRATING ORAL THREE TIMES A DAY
Refills: 3 | Status: ACTIVE | COMMUNITY
Start: 2020-08-25

## 2023-06-08 RX ORDER — DICYCLOMINE HYDROCHLORIDE 10 MG/1
1 CAPSULE UP TO THREE TIMES A DAY AS NEEDED FOR ABDOMINAL PAIN CAPSULE ORAL THREE TIMES A DAY
Refills: 6 | Status: ACTIVE | COMMUNITY
Start: 2020-08-25

## 2023-06-08 RX ORDER — ALBUTEROL SULFATE 90 UG/1
AEROSOL, METERED RESPIRATORY (INHALATION) TWICE A DAY
Refills: 0 | Status: ACTIVE | COMMUNITY
Start: 2021-06-02

## 2023-06-08 RX ORDER — HYDROCODONE BITARTRATE AND ACETAMINOPHEN 7.5; 325 MG/1; MG/1
TABLET ORAL
Refills: 0 | Status: ACTIVE | COMMUNITY
Start: 2021-06-02

## 2023-06-08 RX ORDER — OMEPRAZOLE 40 MG/1
ONCE A DAY CAPSULE, DELAYED RELEASE ORAL ONCE A DAY
Qty: 90 | Refills: 3 | Status: ACTIVE | COMMUNITY

## 2023-06-08 RX ORDER — SIMVASTATIN 40 MG/1
TABLET, FILM COATED ORAL ONCE A DAY
Refills: 0 | Status: ACTIVE | COMMUNITY
Start: 2021-06-02

## 2023-06-08 RX ORDER — OLMESARTAN MEDOXOMIL 20 MG/1
TABLET, FILM COATED ORAL
Refills: 0 | Status: ACTIVE | COMMUNITY
Start: 2020-09-21

## 2023-06-08 RX ORDER — ATENOLOL 100 MG/1
TABLET ORAL ONCE A DAY
Refills: 0 | Status: ACTIVE | COMMUNITY
Start: 2021-06-02

## 2023-06-08 RX ORDER — FERROUS SULFATE 325(65) MG
TABLET ORAL ONCE A DAY
Refills: 0 | Status: ACTIVE | COMMUNITY
Start: 2021-06-02

## 2023-06-08 NOTE — PHYSICAL EXAM CHEST:
decreased breath sounds and wheezing in all fields, breathing is unlabored without accessory muscle use, chest wall non-tender

## 2023-06-08 NOTE — HPI-TODAY'S VISIT:
Generally having 2-3 bm per day but only in the early morning hours with urgency, wakes her up at night around 3 am. Takes imodium twice daily which helps.  Occasional nausea, once a week, resolves with zofran. No abdominal pain, stools loose to mushy. Active disease on last colonoscopy but most recent CTE unremarkable though this is much less sensitive.  Diagnosed with bladder cancer earlier this year and held off on starting skyrizi. I told her to stop azathioprine in march but she waited until early may. Plan is to start skyrizi

## 2023-06-08 NOTE — HPI-CROHN'S DISEASE
Last Colonoscopy:  Patchy mild inflammation found in the entire examined colon, pathology demonstrates active chronic colitis.  Mucosa of the terminal ileum was congested and ulcerated, c/w crohn's ileitis on pathology.  hyperplastic inflammatory polyps in splenic flexure and rectum  Current IBD Meds:  none, planning skyrizi induction  Prior IBD Meds:  Imuran 50mg plus allopurinol 100mg/day  Steroid use/response: Prednisone 40mg/day frequently for lung issues but not chronic  Most recent imagin2019 ugi showed thickening of gastric folds, unable to evaluate due to lung disease 2020  CTE shows no active disease but demonstrates gastric folding thickening  Extraintestinal manifestations:  none  Inflammatory Markers: 2019 CRP wnl  19-On 40mg/day of prednisone: Calprotectin 267.7,  CRP 0.6,  Lactoferrin <30  Required  biologic testin2022 hep b negative, quant gold not run. Last negative quant gold 2021  Risk factors for severe disease: She is current smoker(e-cig), she is trying to quit. She is aware smoking worsens crohn's disease and that I recommend she stop smoking.  IBD surgical history: none  Personal history of cancer:  none  Cardiac history: none

## 2023-06-13 ENCOUNTER — TELEPHONE ENCOUNTER (OUTPATIENT)
Dept: URBAN - METROPOLITAN AREA CLINIC 64 | Facility: CLINIC | Age: 63
End: 2023-06-13

## 2023-07-13 ENCOUNTER — TELEPHONE ENCOUNTER (OUTPATIENT)
Dept: URBAN - METROPOLITAN AREA CLINIC 64 | Facility: CLINIC | Age: 63
End: 2023-07-13

## 2023-08-14 ENCOUNTER — TELEPHONE ENCOUNTER (OUTPATIENT)
Dept: URBAN - METROPOLITAN AREA CLINIC 64 | Facility: CLINIC | Age: 63
End: 2023-08-14

## 2023-08-24 ENCOUNTER — OFFICE VISIT (OUTPATIENT)
Dept: URBAN - METROPOLITAN AREA CLINIC 60 | Facility: CLINIC | Age: 63
End: 2023-08-24

## 2023-09-29 ENCOUNTER — OFFICE VISIT (OUTPATIENT)
Dept: URBAN - METROPOLITAN AREA CLINIC 63 | Facility: CLINIC | Age: 63
End: 2023-09-29

## 2023-11-07 ENCOUNTER — OFFICE VISIT (OUTPATIENT)
Dept: URBAN - METROPOLITAN AREA CLINIC 63 | Facility: CLINIC | Age: 63
End: 2023-11-07

## 2023-12-04 ENCOUNTER — ERX REFILL RESPONSE (OUTPATIENT)
Dept: URBAN - METROPOLITAN AREA CLINIC 60 | Facility: CLINIC | Age: 63
End: 2023-12-04

## 2023-12-04 RX ORDER — ONDANSETRON HYDROCHLORIDE 4 MG/1
TAKE 1 TABLET BY MOUTH 3 TIMES DAILY TABLET, FILM COATED ORAL
Qty: 90 TABLET | Refills: 2 | OUTPATIENT

## 2023-12-04 RX ORDER — ONDANSETRON HYDROCHLORIDE 4 MG/1
TAKE 1 TABLET BY MOUTH 3 TIMES DAILY TABLET, FILM COATED ORAL
Qty: 90 TABLET | Refills: 1 | OUTPATIENT

## 2023-12-15 ENCOUNTER — TELEPHONE ENCOUNTER (OUTPATIENT)
Dept: URBAN - METROPOLITAN AREA CLINIC 64 | Facility: CLINIC | Age: 63
End: 2023-12-15

## 2023-12-20 ENCOUNTER — OFFICE VISIT (OUTPATIENT)
Dept: URBAN - METROPOLITAN AREA CLINIC 60 | Facility: CLINIC | Age: 63
End: 2023-12-20

## 2024-01-11 ENCOUNTER — ERX REFILL RESPONSE (OUTPATIENT)
Dept: URBAN - METROPOLITAN AREA CLINIC 60 | Facility: CLINIC | Age: 64
End: 2024-01-11

## 2024-01-11 RX ORDER — OMEPRAZOLE 40 MG/1
TAKE 1 CAPSULE BY MOUTH DAILY 30 MINUTES BEFORE LARGEST MEAL OF THE DAY CAPSULE, DELAYED RELEASE ORAL
Qty: 30 CAPSULE | Refills: 3 | OUTPATIENT

## 2024-01-11 RX ORDER — OMEPRAZOLE 40 MG/1
ONCE A DAY CAPSULE, DELAYED RELEASE ORAL ONCE A DAY
Qty: 90 | Refills: 3 | OUTPATIENT

## 2024-03-21 ENCOUNTER — OV EP (OUTPATIENT)
Dept: URBAN - METROPOLITAN AREA CLINIC 63 | Facility: CLINIC | Age: 64
End: 2024-03-21

## 2024-05-08 ENCOUNTER — OFFICE VISIT (OUTPATIENT)
Dept: URBAN - METROPOLITAN AREA CLINIC 63 | Facility: CLINIC | Age: 64
End: 2024-05-08

## 2024-05-28 ENCOUNTER — ERX REFILL RESPONSE (OUTPATIENT)
Dept: URBAN - METROPOLITAN AREA CLINIC 63 | Facility: CLINIC | Age: 64
End: 2024-05-28

## 2024-05-28 RX ORDER — OMEPRAZOLE 40 MG/1
TAKE 1 CAPSULE BY MOUTH DAILY 30 MINUTES BEFORE LARGEST MEAL OF THE DAY CAPSULE, DELAYED RELEASE ORAL
Qty: 30 CAPSULE | Refills: 0 | OUTPATIENT

## 2024-05-28 RX ORDER — ONDANSETRON HYDROCHLORIDE 4 MG/1
TAKE 1 TABLET BY MOUTH 3 TIMES DAILY TABLET, FILM COATED ORAL
Qty: 90 TABLET | Refills: 0 | OUTPATIENT

## 2024-06-03 ENCOUNTER — TELEPHONE ENCOUNTER (OUTPATIENT)
Dept: URBAN - METROPOLITAN AREA CLINIC 63 | Facility: CLINIC | Age: 64
End: 2024-06-03

## 2024-06-03 ENCOUNTER — OFFICE VISIT (OUTPATIENT)
Dept: URBAN - METROPOLITAN AREA CLINIC 60 | Facility: CLINIC | Age: 64
End: 2024-06-03
Payer: MEDICARE

## 2024-06-03 ENCOUNTER — LAB OUTSIDE AN ENCOUNTER (OUTPATIENT)
Dept: URBAN - METROPOLITAN AREA CLINIC 60 | Facility: CLINIC | Age: 64
End: 2024-06-03

## 2024-06-03 VITALS
TEMPERATURE: 97.8 F | HEART RATE: 66 BPM | BODY MASS INDEX: 18.8 KG/M2 | DIASTOLIC BLOOD PRESSURE: 78 MMHG | HEIGHT: 66 IN | OXYGEN SATURATION: 97 % | SYSTOLIC BLOOD PRESSURE: 118 MMHG | WEIGHT: 117 LBS

## 2024-06-03 DIAGNOSIS — K21.9 CHRONIC GERD: ICD-10-CM

## 2024-06-03 DIAGNOSIS — K50.80 CROHN'S DISEASE OF BOTH SMALL AND LARGE INTESTINE WITHOUT COMPLICATION: ICD-10-CM

## 2024-06-03 DIAGNOSIS — Z92.25 PERSONAL HISTORY OF IMMUNOSUPPRESSION THERAPY: ICD-10-CM

## 2024-06-03 PROBLEM — 235595009: Status: ACTIVE | Noted: 2024-06-03

## 2024-06-03 PROCEDURE — 99214 OFFICE O/P EST MOD 30 MIN: CPT

## 2024-06-03 RX ORDER — FLUTICASONE PROPIONATE 50 UG/1
SPRAY, METERED NASAL TWICE A DAY
Refills: 0 | Status: ACTIVE | COMMUNITY
Start: 2021-06-02

## 2024-06-03 RX ORDER — POLYETHYLENE GLYCOL 3350, SODIUM CHLORIDE, SODIUM BICARBONATE, POTASSIUM CHLORIDE 420; 11.2; 5.72; 1.48 G/4L; G/4L; G/4L; G/4L
AS DIRECTED POWDER, FOR SOLUTION ORAL ONCE
Qty: 4000 | Refills: 0 | OUTPATIENT
Start: 2024-06-03 | End: 2024-06-04

## 2024-06-03 RX ORDER — ALBUTEROL SULFATE 90 UG/1
AEROSOL, METERED RESPIRATORY (INHALATION) TWICE A DAY
Refills: 0 | Status: ACTIVE | COMMUNITY
Start: 2021-06-02

## 2024-06-03 RX ORDER — SIMVASTATIN 40 MG/1
TABLET, FILM COATED ORAL ONCE A DAY
Refills: 0 | Status: ACTIVE | COMMUNITY
Start: 2021-06-02

## 2024-06-03 RX ORDER — ATENOLOL 100 MG/1
TABLET ORAL ONCE A DAY
Refills: 0 | Status: ACTIVE | COMMUNITY
Start: 2021-06-02

## 2024-06-03 RX ORDER — ASPIRIN 81 MG/1
TABLET, DELAYED RELEASE ORAL ONCE A DAY
Refills: 0 | Status: ACTIVE | COMMUNITY
Start: 2021-06-02

## 2024-06-03 RX ORDER — ONDANSETRON 4 MG/1
THREE TIMES A DAY AS NEEDED FOR NAUSEA TABLET, ORALLY DISINTEGRATING ORAL THREE TIMES A DAY
Refills: 3 | Status: ACTIVE | COMMUNITY
Start: 2020-08-25

## 2024-06-03 RX ORDER — POTASSIUM CITRATE 10 MEQ/1
TABLET, EXTENDED RELEASE ORAL
Refills: 0 | Status: ACTIVE | COMMUNITY
Start: 2021-06-02

## 2024-06-03 RX ORDER — FERROUS SULFATE 325(65) MG
TABLET ORAL ONCE A DAY
Refills: 0 | Status: ACTIVE | COMMUNITY
Start: 2021-06-02

## 2024-06-03 RX ORDER — ALBUTEROL SULFATE 0.63 MG/3ML
SOLUTION RESPIRATORY (INHALATION) AS NEEDED
Refills: 0 | Status: ACTIVE | COMMUNITY
Start: 2021-06-02

## 2024-06-03 RX ORDER — UMECLIDINIUM BROMIDE AND VILANTEROL TRIFENATATE 62.5; 25 UG/1; UG/1
POWDER RESPIRATORY (INHALATION) ONCE A DAY
Refills: 0 | Status: ACTIVE | COMMUNITY
Start: 2021-06-02

## 2024-06-03 RX ORDER — ONDANSETRON HYDROCHLORIDE 4 MG/1
TAKE 1 TABLET BY MOUTH 3 TIMES DAILY TABLET, FILM COATED ORAL
Qty: 90 TABLET | Refills: 0 | Status: ACTIVE | COMMUNITY

## 2024-06-03 RX ORDER — GABAPENTIN 300 MG/1
CAPSULE ORAL TWICE A DAY
Refills: 0 | Status: ACTIVE | COMMUNITY
Start: 2021-06-02

## 2024-06-03 RX ORDER — DICYCLOMINE HYDROCHLORIDE 10 MG/1
1 CAPSULE UP TO THREE TIMES A DAY AS NEEDED FOR ABDOMINAL PAIN CAPSULE ORAL THREE TIMES A DAY
Refills: 6 | Status: ACTIVE | COMMUNITY
Start: 2020-08-25

## 2024-06-03 RX ORDER — HYDROCODONE BITARTRATE AND ACETAMINOPHEN 7.5; 325 MG/1; MG/1
TABLET ORAL
Refills: 0 | Status: ACTIVE | COMMUNITY
Start: 2021-06-02

## 2024-06-03 RX ORDER — OMEPRAZOLE 40 MG/1
TAKE 1 CAPSULE BY MOUTH DAILY 30 MINUTES BEFORE LARGEST MEAL OF THE DAY CAPSULE, DELAYED RELEASE ORAL
Qty: 30 CAPSULE | Refills: 0 | Status: ACTIVE | COMMUNITY

## 2024-06-03 RX ORDER — ONDANSETRON HYDROCHLORIDE 4 MG/1
1 TABLET TABLET, FILM COATED ORAL
Qty: 2 | Refills: 0 | OUTPATIENT
Start: 2024-06-03

## 2024-06-03 NOTE — HPI-PREVIOUS LABS
1/6/2023 iron panel significant for no abnormalities, ferritin elevated at 530, B12, folate, CRP within normal limits, hep B TB negative, CMP significant for BUN of 30, ratio 39, CBC significant for .0, MCH 35.4

## 2024-06-03 NOTE — HPI-ZZZTODAY'S VISIT
Patient is a very pleasant 63-year-old female who presents for medication follow-up.  She is a patient of Dr. Bender.  Last seen on 6/8/2023.  Past medical history significant for Crohn's disease of small intestine, diabetes, current smoker (e-cigarette), COPD, DVT (81 ASA), bladder cancer, GERD.  Past surgical history significant for bladder cancer tumor removed February 2023.  Last colonoscopy 2018.  On her last colonoscopy in 2018 patchy mild inflammation was found in the entire examined colon demonstrating active chronic colitis.  She was planned to be started on Skyrizi, however she was diagnosed with bladder cancer and induction was held for this reason.  She was previously on azathioprine but was discontinued due to diagnosis of bladder cancer, she was previously on Imuran 50 mg plus allopurinol 100 mg daily.  She is on prednisone 40 mg a day frequently for lung issues and responds.  January 2019 upper GI demonstrated thickening gastric folds but unavailable to evaluate due to lung disease.  In February 2020 CTE demonstrated no active disease but did demonstrate gastric fold thickening as well.  Apparently she has no extraintestinal manifestations.  Upon review of her inflammatory markers her CRP does not seem to reflect active disease, her fecal calprotectin does demonstrate mild elevations.  She had negative biologic testing including quant gold at the 1/6/2023. Her risk factors for severe disease include current smoker and comorbidity.  She does not have IBD surgical history.  She also has no cardiac history apparently.  Giselle presents for follow-up.  She is currently doing well from a GI perspective.  She is taking Imodium once at night and has no other symptoms of IBD.  She does have pyrosis occasionally for which she has not tried anything.  She is concerned because she wanted to get on Skyrizi previously but there was confusion between our office and her urologist whether she could start treatment while also undergoing treatment for transition cell bladder cancer.  She denies dysphagia, dyspepsia, abdominal pain, unintentional weight loss, melena, hematochezia.

## 2024-06-10 ENCOUNTER — LAB OUTSIDE AN ENCOUNTER (OUTPATIENT)
Dept: URBAN - METROPOLITAN AREA CLINIC 60 | Facility: CLINIC | Age: 64
End: 2024-06-10

## 2024-06-27 ENCOUNTER — TELEPHONE ENCOUNTER (OUTPATIENT)
Dept: URBAN - METROPOLITAN AREA CLINIC 63 | Facility: CLINIC | Age: 64
End: 2024-06-27

## 2024-06-27 ENCOUNTER — ERX REFILL RESPONSE (OUTPATIENT)
Dept: URBAN - METROPOLITAN AREA CLINIC 63 | Facility: CLINIC | Age: 64
End: 2024-06-27

## 2024-06-27 LAB
A/G RATIO: 1.7
ABSOLUTE BASOPHILS: 76
ABSOLUTE EOSINOPHILS: 114
ABSOLUTE LYMPHOCYTES: 2299
ABSOLUTE MONOCYTES: 1372
ABSOLUTE NEUTROPHILS: 8839
ALBUMIN: 3.8
ALKALINE PHOSPHATASE: 62
ALT (SGPT): 7
AST (SGOT): 12
BASOPHILS: 0.6
BILIRUBIN, TOTAL: 0.4
BUN/CREATININE RATIO: 39
BUN: 32
CALCIUM: 9
CARBON DIOXIDE, TOTAL: 27
CHLORIDE: 104
CHOL/HDLC RATIO: 2.5
CHOLESTEROL, TOTAL: 187
CREATININE: 0.82
EGFR: 80
EOSINOPHILS: 0.9
FERRITIN, SERUM: 235
GLOBULIN, TOTAL: 2.3
GLUCOSE: 98
HDL CHOLESTEROL: 74
HEMATOCRIT: 41.7
HEMOGLOBIN: 13.9
HS CRP: >20
IRON BIND.CAP.(TIBC): 254
IRON SATURATION: 10
IRON: 26
LDL CHOLESTEROL CALC: 97
LYMPHOCYTES: 18.1
MCH: 30.9
MCHC: 33.3
MCV: 92.7
MONOCYTES: 10.8
MPV: 9.9
NEUTROPHILS: 69.6
NON HDL CHOLESTEROL: 113
PLATELET COUNT: 275
POTASSIUM: 4.2
PROTEIN, TOTAL: 6.1
RDW: 12
RED BLOOD CELL COUNT: 4.5
SODIUM: 138
TRIGLYCERIDES: 73
WHITE BLOOD CELL COUNT: 12.7

## 2024-06-27 RX ORDER — ONDANSETRON HYDROCHLORIDE 4 MG/1
TAKE 1 TABLET BY MOUTH 3 TIMES DAILY TABLET, FILM COATED ORAL
Qty: 90 TABLET | Refills: 0 | OUTPATIENT

## 2024-06-27 RX ORDER — OMEPRAZOLE 40 MG/1
TAKE 1 CAPSULE BY MOUTH DAILY 30 MINUTES BEFORE LARGEST MEAL OF THE DAY CAPSULE, DELAYED RELEASE ORAL ONCE A DAY
Qty: 90 | Refills: 3 | OUTPATIENT

## 2024-06-27 RX ORDER — OMEPRAZOLE 40 MG/1
TAKE 1 CAPSULE BY MOUTH DAILY 30 MINUTES BEFORE LARGEST MEAL OF THE DAY CAPSULE, DELAYED RELEASE ORAL
Qty: 30 CAPSULE | Refills: 0 | OUTPATIENT

## 2024-07-24 ENCOUNTER — ERX REFILL RESPONSE (OUTPATIENT)
Dept: URBAN - METROPOLITAN AREA CLINIC 63 | Facility: CLINIC | Age: 64
End: 2024-07-24

## 2024-07-24 RX ORDER — ONDANSETRON HYDROCHLORIDE 4 MG/1
TAKE 1 TABLET BY MOUTH 3 TIMES DAILY TABLET, FILM COATED ORAL
Qty: 90 TABLET | Refills: 0 | OUTPATIENT

## 2024-08-12 ENCOUNTER — OFFICE VISIT (OUTPATIENT)
Dept: URBAN - METROPOLITAN AREA SURGERY CENTER 4 | Facility: SURGERY CENTER | Age: 64
End: 2024-08-12

## 2024-08-20 ENCOUNTER — TELEPHONE ENCOUNTER (OUTPATIENT)
Dept: URBAN - METROPOLITAN AREA CLINIC 63 | Facility: CLINIC | Age: 64
End: 2024-08-20

## 2024-08-20 ENCOUNTER — ERX REFILL RESPONSE (OUTPATIENT)
Dept: URBAN - METROPOLITAN AREA CLINIC 63 | Facility: CLINIC | Age: 64
End: 2024-08-20

## 2024-08-20 RX ORDER — ONDANSETRON HYDROCHLORIDE 4 MG/1
TAKE 1 TABLET BY MOUTH 3 TIMES DAILY TABLET, FILM COATED ORAL
Qty: 90 TABLET | Refills: 0 | OUTPATIENT

## 2024-09-03 ENCOUNTER — LAB OUTSIDE AN ENCOUNTER (OUTPATIENT)
Dept: URBAN - METROPOLITAN AREA CLINIC 60 | Facility: CLINIC | Age: 64
End: 2024-09-03

## 2024-09-09 ENCOUNTER — OFFICE VISIT (OUTPATIENT)
Dept: URBAN - METROPOLITAN AREA SURGERY CENTER 4 | Facility: SURGERY CENTER | Age: 64
End: 2024-09-09

## 2024-09-17 ENCOUNTER — ERX REFILL RESPONSE (OUTPATIENT)
Dept: URBAN - METROPOLITAN AREA CLINIC 63 | Facility: CLINIC | Age: 64
End: 2024-09-17

## 2024-09-17 RX ORDER — ONDANSETRON HYDROCHLORIDE 4 MG/1
TAKE 1 TABLET BY MOUTH 3 TIMES DAILY TABLET, FILM COATED ORAL
Qty: 90 TABLET | Refills: 0 | OUTPATIENT

## 2024-10-21 ENCOUNTER — ERX REFILL RESPONSE (OUTPATIENT)
Dept: URBAN - METROPOLITAN AREA CLINIC 63 | Facility: CLINIC | Age: 64
End: 2024-10-21

## 2024-10-21 RX ORDER — ONDANSETRON HYDROCHLORIDE 4 MG/1
TAKE 1 TABLET BY MOUTH 3 TIMES DAILY TABLET, FILM COATED ORAL
Qty: 90 TABLET | Refills: 0 | OUTPATIENT

## 2024-11-19 ENCOUNTER — ERX REFILL RESPONSE (OUTPATIENT)
Dept: URBAN - METROPOLITAN AREA CLINIC 63 | Facility: CLINIC | Age: 64
End: 2024-11-19

## 2024-11-19 RX ORDER — ONDANSETRON HYDROCHLORIDE 4 MG/1
TAKE 1 TABLET BY MOUTH 3 TIMES DAILY TABLET, FILM COATED ORAL
Qty: 90 TABLET | Refills: 0 | OUTPATIENT

## 2024-12-02 ENCOUNTER — TELEPHONE ENCOUNTER (OUTPATIENT)
Dept: URBAN - METROPOLITAN AREA CLINIC 60 | Facility: CLINIC | Age: 64
End: 2024-12-02

## 2024-12-10 ENCOUNTER — OFFICE VISIT (OUTPATIENT)
Dept: URBAN - METROPOLITAN AREA SURGERY CENTER 4 | Facility: SURGERY CENTER | Age: 64
End: 2024-12-10

## 2025-01-02 ENCOUNTER — ERX REFILL RESPONSE (OUTPATIENT)
Dept: URBAN - METROPOLITAN AREA CLINIC 63 | Facility: CLINIC | Age: 65
End: 2025-01-02

## 2025-01-02 RX ORDER — ONDANSETRON HYDROCHLORIDE 4 MG/1
TAKE 1 TABLET BY MOUTH 3 TIMES DAILY TABLET, FILM COATED ORAL
Qty: 90 TABLET | Refills: 0 | OUTPATIENT

## 2025-01-09 ENCOUNTER — OFFICE VISIT (OUTPATIENT)
Dept: URBAN - METROPOLITAN AREA CLINIC 63 | Facility: CLINIC | Age: 65
End: 2025-01-09

## 2025-01-27 ENCOUNTER — TELEPHONE ENCOUNTER (OUTPATIENT)
Dept: URBAN - METROPOLITAN AREA CLINIC 63 | Facility: CLINIC | Age: 65
End: 2025-01-27

## 2025-01-27 ENCOUNTER — OFFICE VISIT (OUTPATIENT)
Dept: URBAN - METROPOLITAN AREA CLINIC 63 | Facility: CLINIC | Age: 65
End: 2025-01-27
Payer: MEDICARE

## 2025-01-27 VITALS
OXYGEN SATURATION: 98 % | SYSTOLIC BLOOD PRESSURE: 120 MMHG | DIASTOLIC BLOOD PRESSURE: 78 MMHG | HEIGHT: 66 IN | WEIGHT: 117.8 LBS | HEART RATE: 67 BPM | TEMPERATURE: 98.1 F | BODY MASS INDEX: 18.93 KG/M2

## 2025-01-27 DIAGNOSIS — Z92.25 PERSONAL HISTORY OF IMMUNOSUPPRESSION THERAPY: ICD-10-CM

## 2025-01-27 DIAGNOSIS — M70.21 OLECRANON BURSITIS OF RIGHT ELBOW: ICD-10-CM

## 2025-01-27 DIAGNOSIS — K50.80 CROHN'S DISEASE OF BOTH SMALL AND LARGE INTESTINE WITHOUT COMPLICATION: ICD-10-CM

## 2025-01-27 DIAGNOSIS — J44.9 ADVANCED CHRONIC OBSTRUCTIVE PULMONARY DISEASE: ICD-10-CM

## 2025-01-27 DIAGNOSIS — K21.9 CHRONIC GERD: ICD-10-CM

## 2025-01-27 DIAGNOSIS — Z99.81 ON HOME O2: ICD-10-CM

## 2025-01-27 PROBLEM — 931000119107: Status: ACTIVE | Noted: 2025-01-27

## 2025-01-27 PROBLEM — 313297008: Status: ACTIVE | Noted: 2025-01-27

## 2025-01-27 PROCEDURE — 99214 OFFICE O/P EST MOD 30 MIN: CPT

## 2025-01-27 RX ORDER — GABAPENTIN 300 MG/1
CAPSULE ORAL
Qty: 180 EACH | Refills: 0 | Status: ACTIVE | COMMUNITY

## 2025-01-27 RX ORDER — BACLOFEN 10 MG/1
TABLET ORAL
Qty: 90 EACH | Refills: 4 | Status: ACTIVE | COMMUNITY

## 2025-01-27 RX ORDER — FUROSEMIDE 20 MG/1
TABLET ORAL
Qty: 30 EACH | Refills: 0 | Status: ACTIVE | COMMUNITY

## 2025-01-27 RX ORDER — HYDROCODONE BITARTRATE AND ACETAMINOPHEN 7.5; 325 MG/1; MG/1
TABLET ORAL
Qty: 120 TABLET | Status: ACTIVE | COMMUNITY

## 2025-01-27 RX ORDER — ERGOCALCIFEROL 1.25 MG/1
CAPSULE, LIQUID FILLED ORAL
Qty: 4 EACH | Refills: 2 | Status: ACTIVE | COMMUNITY

## 2025-01-27 RX ORDER — OMEPRAZOLE 40 MG/1
CAPSULE, DELAYED RELEASE ORAL
Qty: 30 EACH | Refills: 6 | Status: ACTIVE | COMMUNITY

## 2025-01-27 RX ORDER — SIMVASTATIN 40 MG/1
TABLET, FILM COATED ORAL
Qty: 30 EACH | Refills: 3 | Status: ACTIVE | COMMUNITY

## 2025-01-27 RX ORDER — ONDANSETRON HYDROCHLORIDE 4 MG/1
TABLET, FILM COATED ORAL
Qty: 90 TABLET | Status: ACTIVE | COMMUNITY

## 2025-01-27 RX ORDER — ATENOLOL 100 MG/1
TABLET ORAL
Qty: 30 EACH | Refills: 3 | Status: ACTIVE | COMMUNITY

## 2025-01-27 RX ORDER — UMECLIDINIUM BROMIDE AND VILANTEROL TRIFENATATE 62.5; 25 UG/1; UG/1
POWDER RESPIRATORY (INHALATION)
Qty: 60 EACH | Status: ACTIVE | COMMUNITY

## 2025-01-27 RX ORDER — OXYBUTYNIN CHLORIDE 10 MG/1
TABLET, EXTENDED RELEASE ORAL
Qty: 30 TABLET | Status: ACTIVE | COMMUNITY

## 2025-01-27 RX ORDER — OLMESARTAN MEDOXOMIL 20 MG/1
TABLET, FILM COATED ORAL
Qty: 30 TABLET | Status: ACTIVE | COMMUNITY

## 2025-01-27 RX ORDER — MELOXICAM 15 MG/1
TABLET ORAL
Qty: 30 EACH | Refills: 11 | Status: ACTIVE | COMMUNITY

## 2025-01-27 RX ORDER — ALBUTEROL SULFATE 90 UG/1
AEROSOL, METERED RESPIRATORY (INHALATION)
Qty: 8.5 GRAM | Status: ACTIVE | COMMUNITY

## 2025-01-27 NOTE — HPI-PREVIOUS PROCEDURES
6/4/2018 colonoscopy with Dr. Gilbert consistent with 1 diminutive polyp at splenic flexure Diminutive polyp in rectum Diverticulosis in the entire examined colon Patchy mild inflammation entire examined colon secondary to pancolitis Moderate in the cecal cap and terminal ileum mild and patchy otherwise Nonbleeding internal hemorrhoids Congested and ulcerated mucosa and terminal ileum PATH: Splenic flexure consistent with benign mucosa Cecal colon biopsy consistent with chronic and focal active colitis Terminal ileum consistent with patchy chronic and acute inflammation Hepatic flexure with focal acute inflammation Sigmoid colon consistent with patchy acute inflammation active colitis no definitive dysplasia or malignancy Rectal colon polyp consistent with hyperplastic polyp with inflammation.

## 2025-01-27 NOTE — HPI-ZZZTODAY'S VISIT
Patient is a very pleasant 64-year-old female who presents for colon screening.  She is a patient of Dr. Bender.  I last saw her 6/3/2024.  Past medical history significant for Crohn's disease of small intestine, diabetes, current smoker (E cigarette), COPD, DVT 81 mg aspirin, bladder cancer, GERD.  Past surgical history reviewed.  Last colonoscopy 2018. Last colonoscopy 2018 with patchy mild inflammation in entire examined colon demonstrating active chronic colitis.  Plan on starting Skyrizi however she was diagnosed with bladder cancer and induction was held for this reason.  She previously was on azathioprine but discontinued again for bladder cancer.  Also failed Imuran 50 mg plus allopurinol 100 mg daily.  Previously on prednisone for pulmonary etiology for which her GI would respond.  Denies extraintestinal manifestations.  Risk for severe disease include current smoker comorbidity and history of cancer.  She denies IBD surgical history.  Apparently no cardiac history. Previously patient presented for reestablishing care.  She was taking Imodium once a night with no other symptoms for IBD.  She was occasionally getting pyrosis for which she had not tried anything.  Previously she had discussed getting started on Skyrizi but had confusion whether she could take this while having cancer.  I restaged her with labs as well as with colonoscopy. Patient was lost to follow-up for colonoscopy and labs. She did not start on Skyrizi due to no proof of negative TB or Hep B. Patient presents today for colonoscopy consult.  Symptomatically she is controlling her diarrhea with one Imodium every morning. She expressed that she would not like to be on treatment if she can help it. She has significant scoliosis and this impacts her ability to expend her lungs when flat. She has COPD as well. She uses home oxygen 2-3 L pulsed NC. She also admits she has right elbow pain and swelling for one week. She is planning to go to a walk in clinic for this and is curious what my opinion of the area is. She continues to follow with her PCP who is also a pulmonologist. Last DVT many years ago. She denies dysphagia, dyspepsia, pyrosis, abdominal pain, change in bowel habits, unintentional weight loss, melena, or hematochezia.  Patient denies issues with anesthesia, history of stroke, heart attack, pacemaker, defibrillator, stents, blood thinners,  asthma, AGUSTO, chronic kidney disease and seizures.  She does take 81 mg asa due to history of DVT.

## 2025-01-27 NOTE — PHYSICAL EXAM MUSCULOSKELETAL:
Decreased range of motion of entire spine,, right elbow swelling, right elbow redness, pronounced deformity secondary to scoliosis
done

## 2025-01-27 NOTE — PHYSICAL EXAM GASTROINTESTINAL
Abdomen , soft, nontender, nondistended , no guarding or rigidity , no masses palpable , normal bowel sounds , Liver and Spleen, unable to palpated due to body habitus

## 2025-01-27 NOTE — HPI-PREVIOUS LABS
6/27/2024 iron panel significant for total iron 26, saturation of 10, ferritin within normal limits, lipid panel within normal limits, CMP significant for BUN of 32, ratio 39, LFTs within normal limits, CBC significant for WBC 12.7, neutrophils elevated and monocytes elevated.  CRP elevated at greater than 20 1/12/2023 hep B TB negative

## 2025-06-25 ENCOUNTER — ERX REFILL RESPONSE (OUTPATIENT)
Dept: URBAN - METROPOLITAN AREA CLINIC 63 | Facility: CLINIC | Age: 65
End: 2025-06-25

## 2025-06-25 RX ORDER — OMEPRAZOLE 40 MG/1
TAKE 1 CAPSULE BY MOUTH DAILY 30 MINUTES BEFORE LARGEST MEAL OF THE DAY CAPSULE, DELAYED RELEASE ORAL
Qty: 90 CAPSULE | Refills: 3 | OUTPATIENT